# Patient Record
Sex: FEMALE | Race: WHITE | Employment: FULL TIME | URBAN - METROPOLITAN AREA
[De-identification: names, ages, dates, MRNs, and addresses within clinical notes are randomized per-mention and may not be internally consistent; named-entity substitution may affect disease eponyms.]

---

## 2021-09-25 ENCOUNTER — APPOINTMENT (OUTPATIENT)
Dept: GENERAL RADIOLOGY | Age: 31
End: 2021-09-25
Payer: COMMERCIAL

## 2021-09-25 ENCOUNTER — HOSPITAL ENCOUNTER (EMERGENCY)
Age: 31
Discharge: HOME | End: 2021-09-25
Attending: STUDENT IN AN ORGANIZED HEALTH CARE EDUCATION/TRAINING PROGRAM
Payer: COMMERCIAL

## 2021-09-25 VITALS
OXYGEN SATURATION: 100 % | HEIGHT: 69 IN | TEMPERATURE: 98.4 F | RESPIRATION RATE: 16 BRPM | HEART RATE: 70 BPM | DIASTOLIC BLOOD PRESSURE: 71 MMHG | WEIGHT: 150 LBS | BODY MASS INDEX: 22.22 KG/M2 | SYSTOLIC BLOOD PRESSURE: 101 MMHG

## 2021-09-25 DIAGNOSIS — M54.14 THORACIC RADICULOPATHY: Primary | ICD-10-CM

## 2021-09-25 DIAGNOSIS — M25.511 ACUTE PAIN OF RIGHT SHOULDER: ICD-10-CM

## 2021-09-25 PROCEDURE — 73030 X-RAY EXAM OF SHOULDER: CPT

## 2021-09-25 PROCEDURE — 71045 X-RAY EXAM CHEST 1 VIEW: CPT

## 2021-09-25 PROCEDURE — 99283 EMERGENCY DEPT VISIT LOW MDM: CPT

## 2021-09-25 RX ORDER — CYCLOBENZAPRINE HCL 5 MG
5 TABLET ORAL 2 TIMES DAILY PRN
Qty: 20 TABLET | Refills: 0 | Status: SHIPPED | OUTPATIENT
Start: 2021-09-25 | End: 2021-10-05

## 2021-09-25 RX ORDER — IBUPROFEN 800 MG/1
800 TABLET ORAL 2 TIMES DAILY PRN
Qty: 60 TABLET | Refills: 0 | Status: SHIPPED | OUTPATIENT
Start: 2021-09-25

## 2021-09-25 ASSESSMENT — PAIN DESCRIPTION - LOCATION: LOCATION: SHOULDER

## 2021-09-25 ASSESSMENT — PAIN DESCRIPTION - PAIN TYPE: TYPE: ACUTE PAIN

## 2021-09-25 ASSESSMENT — PAIN SCALES - GENERAL: PAINLEVEL_OUTOF10: 2

## 2021-09-25 ASSESSMENT — PAIN DESCRIPTION - ORIENTATION: ORIENTATION: RIGHT

## 2021-09-26 NOTE — ED PROVIDER NOTES
I signed up for this patient but did not see this patient. Patient was seen solely by midlevel provider Taras Amaya. I did not see this patient and was not involved in her care.      Santana Hobbs MD  09/26/21 [id number]

## 2021-09-26 NOTE — ED PROVIDER NOTES
[facility] Emergency Department    CHIEF COMPLAINT  Shoulder Pain (pt reports right shoulder pain radiating into her chest on right side. says it started after taking her shirt off today. says she had to pull \"pretty tight\" to remove the shirt. says the pain is worse when she takes a deep breath. )      HISTORY OF PRESENT ILLNESS  Brittany Resendiz is a 27 y.o. female who presents to the ED complaining of pain that started in her right shoulder blade and is now radiating beneath her right clavicle no known injury. Patient noticed the symptoms today after taking off a tight fitted shirt. Patient denies numbness or tingling in her right upper extremity. Patient denies weakness and right hand or upper extremity. Patient denies ever experiencing anything like this before. Patient did not take anything for pain prior to arrival.  No other complaints, modifying factors or associated symptoms. Nursing notes reviewed. History reviewed. No pertinent past medical history. Past Surgical History:   Procedure Laterality Date    ECTOPIC PREGNANCY SURGERY       History reviewed. No pertinent family history. Social History     Socioeconomic History    Marital status:      Spouse name: Not on file    Number of children: Not on file    Years of education: Not on file    Highest education level: Not on file   Occupational History    Not on file   Tobacco Use    Smoking status: Current Some Day Smoker    Smokeless tobacco: Never Used   Substance and Sexual Activity    Alcohol use:  Yes    Drug use: Never    Sexual activity: Yes   Other Topics Concern    Not on file   Social History Narrative    Not on file     Social Determinants of Health     Financial Resource Strain:     Difficulty of Paying Living Expenses:    Food Insecurity:     Worried About Running Out of Food in the Last Year:     [address] in the Last Year:    Transportation Needs:     Lack of Transportation thoracic, lumbar spine, there is no swelling, bruising, or color change noted. There is thoracic midline bony tenderness, without crepitus, deformity, or step off. Patient exhibits tenderness of paraspinal musculature to the right of midline of the thoracic region. Distal pulses intact. Cap refill < 2 seconds. Sensation intact. Neurovascularly intact. HSNE spine intact. PERC Rule:  Applicable in this patient who has low clinical suspicion for pulmonary embolism. Age < 48years old: Yes  Heart rate < 100 bpm: Yes  Oxygen saturation > 95%: Yes  Hemoptysis: No  Exogenous estrogen use: No  Prior history of DVT or PE: No  Unilateral leg swelling: No  Surgery or significant trauma in the past 4 weeks: No    Based on the above, PE can effectively be ruled out without further testing. RADIOLOGY  XR SHOULDER RIGHT (MIN 2 VIEWS)    Result Date: 9/25/2021  EXAMINATION: THREE XRAY VIEWS OF THE RIGHT SHOULDER 9/25/2021 5:04 pm COMPARISON: None. HISTORY: ORDERING SYSTEM PROVIDED HISTORY: shoulder pain TECHNOLOGIST PROVIDED HISTORY: Reason for exam:->shoulder pain Reason for Exam: right shoulder pain Acuity: Acute Type of Exam: Initial FINDINGS: No acute fracture or dislocation. [facility] and glenohumeral joint are unremarkable. No periarticular soft tissue calcification. No acute osseous abnormality of the visualized right hemithorax. No acute osseous abnormality of the right shoulder. XR CHEST PORTABLE    Result Date: 9/25/2021  EXAMINATION: ONE XRAY VIEW OF THE CHEST 9/25/2021 5:04 pm COMPARISON: None. HISTORY: ORDERING SYSTEM PROVIDED HISTORY: right chest pain TECHNOLOGIST PROVIDED HISTORY: Reason for exam:->right chest pain Reason for Exam: right sided chest pain after taking shirt off Acuity: Acute Type of Exam: Initial FINDINGS: The cardiomediastinal silhouette is unremarkable. The lungs are clear. No infiltrate, pleural fluid or focal process is identified. No acute cardiopulmonary disease. ED COURSE/MDM  Patient seen and evaluated. Old records reviewed. Diagnostic testing reviewed and results discussed. I have independently evaluated this patient based upon my scope of practice. Supervising physician was in the department as needed for consultation. Odalis Aparicio presented to the ED today with above noted complaints. X-ray of right shoulder shows no acute osseous abnormality. Chest x-ray shows no acute cardiopulmonary disease. Lungs are clear. Given reproducible tenderness in the thoracic spine and in the right chest wall beneath the right clavicle this appears to be musculoskeletal in nature low suspicion for cardiac etiology or PE. We discussed close follow-up with provided referral for PCP and she was also provided with orthopedic referral if symptoms not improved patient agreeable with this plan of care. At this point I do not feel the patient requires further work up and it is reasonable to discharge the patient. A discussion was had with the patient and/or their surrogate regarding diagnosis, diagnostic testing results, treatment/ plan of care, and follow up. There was shared decision-making between myself as well as the patient and/or their surrogate and we are all in agreement with discharge home. There was an opportunity for questions and all questions were answered to the best of my ability and to the satisfaction of the patient and/or patient family. Patient will follow up with pcp and ortho for further evaluation/treatment. The patient was given strict return precautions as we discussed symptoms that would necessitate return to the ED. Patient will return to ED for new/worsening symptoms. The patient verbalized their understanding and agreement with the above plan. Please refer to AVS for further details regarding discharge instructions. No results found for this visit on 09/25/21.     I estimate there is LOW risk for PULMONARY EMBOLISM, ACUTE CORONARY SYNDROME, OR THORACIC AORTIC DISSECTION, thus I consider the discharge disposition reasonable. Hazel Benavides and I have discussed the diagnosis and risks, and we agree with discharging home to follow-up with their primary doctor. We also discussed returning to the Emergency Department immediately if new or worsening symptoms occur. We have discussed the symptoms which are most concerning (e.g., bloody sputum, fever, worsening pain or shortness of breath, vomiting) that necessitate immediate return. FINAL Impression    1. Thoracic radiculopathy    2. Acute pain of right shoulder        Blood pressure 101/71, pulse 70, temperature 98.4 °F (36.9 °C), temperature source Oral, resp. rate 16, height 5' 9\" (1.753 m), weight 150 lb (68 kg), last menstrual period 08/30/2021, SpO2 100 %. mdm    Patient was sent home with a prescription for below medication/s. I did [place] patient on appropriate use of these medication. Discharge Medication List as of 9/25/2021  7:00 PM      START taking these medications    Details   cyclobenzaprine (FLEXERIL) 5 MG tablet Take 1 tablet by mouth 2 times daily as needed for Muscle spasms, Disp-20 tablet, R-0Normal      ibuprofen (ADVIL;MOTRIN) 800 MG tablet Take 1 tablet by mouth 2 times daily as needed for Pain, Disp-60 tablet, R-0Normal             FOLLOW UP  [address], [address] [place] [id number] [phone]  Schedule an appointment as soon as possible for a visit   follow up    Ryanne Le MD  [address]          DISPOSITION  Patient was discharged to home in good condition.     Comment: Please note this report has been produced using speech recognition software and may contain errors related to that system including errors in grammar, punctuation, and spelling, as well as words and phrases that may be inappropriate. If there are any questions or concerns please feel free to contact the dictating provider for clarification.         NIELS Canas - CNP  09/25/21 2015       NIELS Canas CNP  09/25/21 2016